# Patient Record
(demographics unavailable — no encounter records)

---

## 2025-04-07 NOTE — ASSESSMENT
[FreeTextEntry1] : #PSA screening - repeat PSA 1 -2 years - PSA 0.54 3/2025 - PSA 0.4 3/2024 - PSA 0.49 3/2023 - PSA 1.23 9/2021 Today I discussed the risks and benefits of PSA screening. There are a number of benign conditions including UTI, BPH, adam catheter, certain activities and prostatitis that will increase PSA in the absence of cancer. Unfortunately, the only way to definitively diagnose cancer is with a prostate biopsy. I discussed the method of performing biopsy (transperineal with anesthesia) and the risks (bleeding, infection, urinary retention, ED). In addition to this, the patient and I discussed the discrepancy between the prevalence of prostate cancer and the prevalence of death from prostate cancer.  Prostate cancer is the most common solid tumor in [place] men. However, I mentioned how it can be very slow growing, many men with prostate cancer will die with the disease rather than from it.  #BPH/LUTS - c/w Flomax and finasteride - meds renewed -Consider Uroflow/PVR next visit  #ED - not bothersome - Cialis 20mg prn - canceled medication as pt is no longer taking - Not interested in penile constrictive ring, penile vacuum, ICI with Trimix or any intraurethral medication - No Plan for Trial of Intracavernosal penile injections with Trimix (Papaverine 30mg/mL, phentolamine 1mg/mL, PGE1 10 mcg/mL) and Penile Doppler US   The following was also discussed with the patient: Erectile dysfunction, its etiology, risk factors and natural history were discussed with the patient. Work-up and empiric management were discussed. From least to most invasive, treatments including behavioral changes (weight loss, exercise, improved sleep), oral PDE5i, vacuum erection device, intraurethral pellets, intracavernosal injections, and penile prosthetic implantation were described. Relevant individual risks and benefits disclosed. I explained that there are different causes for ED including psychogenic, vasculogenic, neurogenic and medication side-effect related causes. Oftentimes there are multiple causes.   The patient understands that the risks of PDE5is include facial redness, flushing, GERD, back pain, priapism, chest pain/MI, arrhythmia, dizziness, drop in BP, impaired vision and loss of hearing. He understands that the medication may take up to an hour to function and requires sexual stimulation. He was told not to take his medication within 4 hours of alpha blockers, or not at all if ever taking nitroglycerin. Both sildenafil and vardenafil, but not tadalafil, have some cross-reactivity with PDE6 and thus may produce visual side effects. He also was told to go to the ER immediately if he noticed any blindness or visual changes, or for any painful erection lasting > 4 hrs. He denies any history nitrate medication use for angina.

## 2025-04-07 NOTE — HISTORY OF PRESENT ILLNESS
[FreeTextEntry1] : Prior pt of Dr. Dowling followed for LUTS and ED. Prior note below for reference: bothersome lower urinary tract symptoms including urinary urgency and incontinence. Also complains of weak urinary stream-doing well with flomax and finasteride and happy with results. Patient denies dysuria and gross hematuria.  prescribed sildenafil -- developed bad abdominal pain  Jan 2022 US Kidney and Bladder-- prostate 90g with pvr of 125ml  Office Visit 04/07/2025  Prior visit pt was started on tadalafil 20mg by Dr. Dowling. Pt could not tolerate medication as it gave him severe GERD refractory to all meds. He is currently satisfied being off all PDE5is. He is voiding without issues, on flomax and finasteride.